# Patient Record
Sex: MALE | Race: WHITE | Employment: FULL TIME | URBAN - NONMETROPOLITAN AREA
[De-identification: names, ages, dates, MRNs, and addresses within clinical notes are randomized per-mention and may not be internally consistent; named-entity substitution may affect disease eponyms.]

---

## 2019-09-19 ENCOUNTER — OFFICE VISIT (OUTPATIENT)
Dept: FAMILY MEDICINE CLINIC | Age: 29
End: 2019-09-19
Payer: COMMERCIAL

## 2019-09-19 VITALS
HEART RATE: 90 BPM | OXYGEN SATURATION: 97 % | HEIGHT: 63 IN | TEMPERATURE: 98.6 F | SYSTOLIC BLOOD PRESSURE: 112 MMHG | BODY MASS INDEX: 44.65 KG/M2 | WEIGHT: 252 LBS | DIASTOLIC BLOOD PRESSURE: 78 MMHG | RESPIRATION RATE: 20 BRPM

## 2019-09-19 DIAGNOSIS — Z11.4 SCREENING FOR HIV (HUMAN IMMUNODEFICIENCY VIRUS): ICD-10-CM

## 2019-09-19 DIAGNOSIS — Z72.0 TOBACCO ABUSE: ICD-10-CM

## 2019-09-19 DIAGNOSIS — E66.01 CLASS 3 SEVERE OBESITY WITH BODY MASS INDEX (BMI) OF 40.0 TO 44.9 IN ADULT, UNSPECIFIED OBESITY TYPE, UNSPECIFIED WHETHER SERIOUS COMORBIDITY PRESENT (HCC): ICD-10-CM

## 2019-09-19 DIAGNOSIS — J06.9 VIRAL URI: Primary | ICD-10-CM

## 2019-09-19 DIAGNOSIS — Z23 NEED FOR TETANUS BOOSTER: ICD-10-CM

## 2019-09-19 DIAGNOSIS — Z00.00 VISIT FOR WELL MAN HEALTH CHECK: ICD-10-CM

## 2019-09-19 DIAGNOSIS — Z23 NEED FOR IMMUNIZATION AGAINST INFLUENZA: ICD-10-CM

## 2019-09-19 DIAGNOSIS — H61.21 IMPACTED CERUMEN OF RIGHT EAR: ICD-10-CM

## 2019-09-19 PROCEDURE — 90686 IIV4 VACC NO PRSV 0.5 ML IM: CPT | Performed by: FAMILY MEDICINE

## 2019-09-19 PROCEDURE — 99385 PREV VISIT NEW AGE 18-39: CPT | Performed by: FAMILY MEDICINE

## 2019-09-19 PROCEDURE — G8427 DOCREV CUR MEDS BY ELIG CLIN: HCPCS | Performed by: FAMILY MEDICINE

## 2019-09-19 PROCEDURE — G8417 CALC BMI ABV UP PARAM F/U: HCPCS | Performed by: FAMILY MEDICINE

## 2019-09-19 PROCEDURE — 90471 IMMUNIZATION ADMIN: CPT | Performed by: FAMILY MEDICINE

## 2019-09-19 PROCEDURE — 69209 REMOVE IMPACTED EAR WAX UNI: CPT | Performed by: FAMILY MEDICINE

## 2019-09-19 PROCEDURE — 4004F PT TOBACCO SCREEN RCVD TLK: CPT | Performed by: FAMILY MEDICINE

## 2019-09-19 PROCEDURE — 99203 OFFICE O/P NEW LOW 30 MIN: CPT | Performed by: FAMILY MEDICINE

## 2019-09-19 RX ORDER — GUAIFENESIN 600 MG/1
600 TABLET, EXTENDED RELEASE ORAL 2 TIMES DAILY PRN
Qty: 14 TABLET | Refills: 0 | Status: SHIPPED | OUTPATIENT
Start: 2019-09-19 | End: 2019-09-26

## 2019-09-19 ASSESSMENT — PATIENT HEALTH QUESTIONNAIRE - PHQ9
SUM OF ALL RESPONSES TO PHQ9 QUESTIONS 1 & 2: 0
SUM OF ALL RESPONSES TO PHQ QUESTIONS 1-9: 0
SUM OF ALL RESPONSES TO PHQ QUESTIONS 1-9: 0
2. FEELING DOWN, DEPRESSED OR HOPELESS: 0
1. LITTLE INTEREST OR PLEASURE IN DOING THINGS: 0

## 2019-09-19 ASSESSMENT — ENCOUNTER SYMPTOMS
EYE PAIN: 0
VOMITING: 0
COUGH: 0
TROUBLE SWALLOWING: 0
SHORTNESS OF BREATH: 0
DIARRHEA: 0
CONSTIPATION: 0
ABDOMINAL PAIN: 0
BLOOD IN STOOL: 0
NAUSEA: 0
BACK PAIN: 0

## 2019-09-19 NOTE — LETTER
[address] [facility] [address]  Phone: [phone]  Fax: [phone]    Ratna Martinez MD      September 19, 2019     Patient: Tito Maciel   YOB: 1990   Date of Visit: 9/19/2019       To Whom it May Concern:    Tito Maciel was seen in my clinic on 9/19/2019. He may return to work on 9/20/2019. If you have any questions or concerns, please don't hesitate to call.     Sincerely,         Ratna Martinez MD

## 2019-09-19 NOTE — PROGRESS NOTES
Julien Stephenson is a 34 y.o. male who presents today for:  Chief Complaint   Patient presents with    Cough     x friday - dry cough     Head Congestion     x friday    Chest Congestion     x friday     Wheezing     at times- worse when lying down     Fatigue    Other     would like work note        Goals    None         HPI:     HPI  33 yo M presents for evaluation of chest congestion that has been present since 9/13/2019. He states congestion is worse with laying down. Took some over the counter generic nyquil which helps for about 1 hour but congestion keeps coming back. He is a current smoker - 4-5 cigarettes per day x about 10 years. Also has a cough - cough is dry and feels like pressure at upper chest. Does not usually have a cough as a smoker. Has had occasional rise in temperature but did not check temp; no chills. Denies nausea, vomiting, constipation. Had diarrhea for a few days with loose, watery stools; this resolved on its own. Was taking nyquil at that time; diarrhea resolved with stopping nyquil. Is able to lay flat and sleep for a little bit, about an hour. Then cough starts again. Is requesting a work note today. Denies headaches and jaw pain. Sore throat only with cough; throat is irritated with coughing. Is eating and drinking okay. Denies muscle/body aches. No sick contacts at home or at work. Does not take any daily medications. Was going to [facility] in the past, being managed for bipolar depression, was on seroquel. No longer receiving management. Was being seen for depression. Had manic episodes in the past, quite a few. Has not had any in over a year since he stopped following up. Is currently smoking every day. Quit in the past and replaced with food. Quit for almost 1 year. Has thought about quitting again. Wants to work on it on his own. Girlfriend does not smoke and is supportive of him quitting.      HM: is due for tetanus booster, flu vaccination, and HIV screen - consents to all 3. Had chickenpox in childhood. History reviewed. No pertinent past medical history. No past surgical history on file. Family History   Problem Relation Age of Onset    High Blood Pressure Mother     Diabetes Mother     Other Mother         COPD     Social History     Tobacco Use    Smoking status: Current Every Day Smoker     Packs/day: 0.25     Types: Cigarettes    Smokeless tobacco: Never Used   Substance Use Topics    Alcohol use: Not Currently      Current Outpatient Medications   Medication Sig Dispense Refill    guaiFENesin (MUCINEX) 600 MG extended release tablet Take 1 tablet by mouth 2 times daily as needed for Congestion 14 tablet 0     No current facility-administered medications for this visit. Allergies   Allergen Reactions    Seroquel [Quetiapine] Hives and Swelling       Health Maintenance   Topic Date Due    Varicella Vaccine (1 of 2 - 13+ 2-dose series) 01/15/2003    HIV screen  01/15/2005    DTaP/Tdap/Td vaccine (1 - Tdap) 01/15/2009    Flu vaccine (1) 09/01/2019    Pneumococcal 0-64 years Vaccine  Aged Out       Subjective:      Review of Systems   Constitutional: Negative for chills, fatigue and fever. HENT: Positive for congestion (Nose and chest). Negative for ear pain, postnasal drip and trouble swallowing. Eyes: Negative for pain and visual disturbance. Respiratory: Negative for cough and shortness of breath. Cardiovascular: Negative for chest pain and palpitations. Gastrointestinal: Negative for abdominal pain, blood in stool, constipation, diarrhea, nausea and vomiting. Genitourinary: Negative for dysuria. Musculoskeletal: Negative for arthralgias, back pain, myalgias and neck pain. Skin: Negative for rash. Neurological: Negative for headaches.        Objective:     Vitals:    09/19/19 1135   BP: 112/78   Site: Right Upper Arm   Position: Sitting   Pulse: 90   Resp: 20   Temp: 98.6 °F (37 °C)   TempSrc: Oral   SpO2: 97% performed a history and physical examination of the patient and discussed management with the resident. I reviewed the resident's note and agree with the documented findings and plan of care. Well care today. Mild URI symptoms not enough to preclude immunization. Encouraged mental health follow-up and reviewed indications for prompt return; declining any intervention for mood at this time and doing well; work going well.

## 2019-09-19 NOTE — PATIENT INSTRUCTIONS
Low-Carbohydrate Diets for Weight Loss  What is a low-carbohydrate diet? Low-carb diets avoid foods that are high in carbohydrate. These high-carb foods include pasta, bread, rice, cereal, fruits, and starchy vegetables. Instead, these diets usually have you eat foods that are high in fat and protein. Many people lose weight quickly on a low-carb diet. But the early weight loss is water. People on this diet often gain the weight back after they start eating carbs again. Not all diet plans are safe or work well. A lot of the evidence shows that low-carb diets aren't healthy. That's because these diets often don't include healthy foods like fruits and vegetables. Losing weight safely means balancing protein, fat, and carbs with every meal and snack. And low-carb diets don't always provide the vitamins, minerals, and fiber you need. If you have a serious medical condition, talk to your doctor before you try any diet. These conditions include kidney disease, heart disease, type 2 diabetes, high cholesterol, and high blood pressure. If you are pregnant, it may not be safe for your baby if you are on a low-carb diet. How can you lose weight safely? You might have heard that a diet plan helped another person lose weight. But that doesn't mean that it will work for you. It is very hard to stay on a diet that includes lots of big changes in your eating habits. If you want to get to a healthy weight and stay there, making healthy lifestyle changes will often work better than dieting. These steps can help. · Make a plan for change. Work with your doctor to create a plan that is right for you. · See a dietitian. He or she can show you how to make healthy changes in your eating habits. · Manage stress. If you have a lot of stress in your life, it can be hard to focus on making healthy changes to your daily habits. · Track your food and activity.  You are likely to do better at losing weight if you keep track of what leg.    Side-lying leg lift    1. Lie on your side, with your legs extended. Keep your hips straight up and down during this exercise. Do not let your top hip rock toward the back. Support your head with your hand, and place the other hand on the floor near your waist.  2. Slowly raise your upper leg until it is about in line with your shoulder. Keep your toes pointed forward. 3. Slowly lower your leg to the starting position. 4. Repeat 8 to 12 times. 5. Rest for a minute, and repeat the exercise. 6. Turn to your other side and do the same exercise with your other leg. Shallow standing knee bends    1. Stand with your hands lightly resting on a counter or chair in front of you with your feet shoulder-width apart. 2. Slowly bend your knees so that you squat down just like you were going to sit in a chair. Make sure your knees do not go in front of your toes. 3. Lower yourself about 6 inches. Your heels should remain on the floor at all times. 4. Rise slowly to a standing position. 5. Repeat 8 to 12 times. 6. Rest for a minute, and repeat the exercise. Follow-up care is a key part of your treatment and safety. Be sure to make and go to all appointments, and call your doctor if you are having problems. It's also a good idea to know your test results and keep a list of the medicines you take. Where can you learn more? Go to https://[employer]. org and sign in to your [employer] account. Enter T559 in the View Medical box to learn more about \"Muscle Conditioning: Exercises. \"     If you do not have an account, please click on the \"Sign Up Now\" link. Current as of: August 19, 2018  Content Version: 12.1  © 9919-0975 [employer]. Care instructions adapted under license by [facility] ([facility]).  If you have questions about a medical condition or this instruction, always ask your healthcare professional. [address]  any warranty or liability for your use

## 2019-09-19 NOTE — PROGRESS NOTES
Meka Harkins is a 34 y.o. male who presents today for:  Chief Complaint   Patient presents with    Cough     x friday - dry cough     Head Congestion     x friday    Chest Congestion     x friday     Wheezing     at times- worse when lying down     Fatigue    Other     would like work note        Goals    None         HPI:     HPI  35 yo M presents for evaluation of chest congestion that has been present since 9/13/2019. He states congestion is worse with laying down. Took some over the counter generic nyquil which helps for about 1 hour but congestion keeps coming back. He is a current smoker - 4-5 cigarettes per day x about 10 years. Also has a cough - cough is dry and feels like pressure at upper chest. Does not usually have a cough as a smoker. Has had occasional rise in temperature but did not check temp; no chills. Denies nausea, vomiting, constipation. Had diarrhea for a few days with loose, watery stools; this resolved on its own. Was taking nyquil at that time; diarrhea resolved with stopping nyquil. Is able to lay flat and sleep for a little bit, about an hour. Then cough starts again. Is requesting a work note today. Denies headaches and jaw pain. Sore throat only with cough; throat is irritated with coughing. Is eating and drinking okay. Denies muscle/body aches. No sick contacts at home or at work. Does not take any daily medications. Was going to [facility] in the past, being managed for bipolar depression, was on seroquel and had allergic reaction to this. No longer receiving management. Was being seen for depression. Had manic episodes in the past, quite a few. Has not had any in over a year since he stopped following up. Is currently smoking every day. Quit in the past and replaced with food. Quit for almost 1 year. Has thought about quitting again. Wants to work on it on his own. Girlfriend does not smoke and is supportive of him quitting.      HM: is due for tetanus booster, flu vaccination, and HIV screen - consents to all 3. Had chickenpox in childhood. History reviewed. No pertinent past medical history. No past surgical history on file. Family History   Problem Relation Age of Onset    High Blood Pressure Mother     Diabetes Mother     Other Mother         COPD     Social History     Tobacco Use    Smoking status: Current Every Day Smoker     Packs/day: 0.25     Types: Cigarettes    Smokeless tobacco: Never Used   Substance Use Topics    Alcohol use: Not Currently      Current Outpatient Medications   Medication Sig Dispense Refill    guaiFENesin (MUCINEX) 600 MG extended release tablet Take 1 tablet by mouth 2 times daily as needed for Congestion 14 tablet 0     No current facility-administered medications for this visit. Allergies   Allergen Reactions    Seroquel [Quetiapine] Hives and Swelling       Health Maintenance   Topic Date Due    Varicella Vaccine (1 of 2 - 13+ 2-dose series) 01/15/2003    HIV screen  01/15/2005    DTaP/Tdap/Td vaccine (1 - Tdap) 01/15/2009    Flu vaccine (1) 09/01/2019    Pneumococcal 0-64 years Vaccine  Aged Out       Subjective:      Review of Systems   Constitutional: Negative for chills, fatigue and fever. HENT: Positive for congestion (Nose and chest). Negative for ear pain, postnasal drip and trouble swallowing. Eyes: Negative for pain and visual disturbance. Respiratory: Negative for cough and shortness of breath. Cardiovascular: Negative for chest pain and palpitations. Gastrointestinal: Negative for abdominal pain, blood in stool, constipation, diarrhea, nausea and vomiting. Genitourinary: Negative for dysuria. Musculoskeletal: Negative for arthralgias, back pain, myalgias and neck pain. Skin: Negative for rash. Neurological: Negative for headaches.        Objective:     Vitals:    09/19/19 1135   BP: 112/78   Site: Right Upper Arm   Position: Sitting   Pulse: 90   Resp: 20   Temp: 98.6 °F (37 °C) TempSrc: Oral   SpO2: 97%   Weight: 252 lb (114.3 kg)   Height: 5' 3\" (1.6 m)       Body mass index is 44.64 kg/m². Wt Readings from Last 3 Encounters:   09/19/19 252 lb (114.3 kg)     BP Readings from Last 3 Encounters:   09/19/19 112/78       Physical Exam   Constitutional: He is oriented to person, place, and time. He appears well-developed and well-nourished. No distress. HENT:   Head: Normocephalic and atraumatic. Right Ear: External ear normal.   Left Ear: External ear normal.   Nose: Nose normal.   Mouth/Throat: Oropharynx is clear and moist. No oropharyngeal exudate. Cerumen impaction, right ear. Tonsil nonedematous and nonerythematous bilaterally. Eyes: Pupils are equal, round, and reactive to light. Conjunctivae and EOM are normal. Right eye exhibits no discharge. Left eye exhibits no discharge. No scleral icterus. Neck: Normal range of motion. Cardiovascular: Normal rate, regular rhythm and normal heart sounds. No murmur heard. Pulmonary/Chest: Effort normal and breath sounds normal. No respiratory distress. He has no wheezes. He exhibits no tenderness. Abdominal: Soft. Bowel sounds are normal. He exhibits no distension. Musculoskeletal: He exhibits no edema. Lymphadenopathy:     He has no cervical adenopathy. Neurological: He is alert and oriented to person, place, and time. No cranial nerve deficit. Skin: Skin is warm and dry. Capillary refill takes less than 2 seconds. No rash noted. He is not diaphoretic. No erythema. Psychiatric: He has a normal mood and affect. His behavior is normal. Judgment and thought content normal.   Nursing note and vitals reviewed. No results found for: WBC, HGB, HCT, PLT, CHOL, TRIG, HDL, LDLDIRECT, LDLCALC, AST, NA, K, CL, CREATININE, BUN, CO2, TSH, PSA, INR, GLUF, LABA1C, LABMICR, LABGLOM, MG, CALCIUM, VITD25    Ceruminosis is noted on the right. Wax is removed by syringing and manual debridement.  Instructions for home care to

## 2019-09-22 LAB — HIV-2 AB: NEGATIVE
